# Patient Record
Sex: MALE | Race: ASIAN | Employment: FULL TIME | URBAN - METROPOLITAN AREA
[De-identification: names, ages, dates, MRNs, and addresses within clinical notes are randomized per-mention and may not be internally consistent; named-entity substitution may affect disease eponyms.]

---

## 2017-10-24 ENCOUNTER — OFFICE VISIT (OUTPATIENT)
Dept: INTERNAL MEDICINE | Facility: CLINIC | Age: 27
End: 2017-10-24

## 2017-10-24 VITALS
HEIGHT: 70 IN | TEMPERATURE: 98.8 F | HEART RATE: 61 BPM | RESPIRATION RATE: 18 BRPM | DIASTOLIC BLOOD PRESSURE: 72 MMHG | BODY MASS INDEX: 26.33 KG/M2 | WEIGHT: 183.9 LBS | SYSTOLIC BLOOD PRESSURE: 112 MMHG | OXYGEN SATURATION: 99 %

## 2017-10-24 DIAGNOSIS — R05.9 COUGH: ICD-10-CM

## 2017-10-24 DIAGNOSIS — R35.0 URINARY FREQUENCY: ICD-10-CM

## 2017-10-24 DIAGNOSIS — Z23 NEED FOR INFLUENZA VACCINATION: ICD-10-CM

## 2017-10-24 DIAGNOSIS — J30.89 CHRONIC NON-SEASONAL ALLERGIC RHINITIS, UNSPECIFIED TRIGGER: ICD-10-CM

## 2017-10-24 DIAGNOSIS — R06.02 SHORTNESS OF BREATH: ICD-10-CM

## 2017-10-24 DIAGNOSIS — Z76.89 ENCOUNTER TO ESTABLISH CARE: Primary | ICD-10-CM

## 2017-10-24 LAB
ALBUMIN UR-MCNC: NEGATIVE MG/DL
ANION GAP SERPL CALCULATED.3IONS-SCNC: 6 MMOL/L (ref 3–14)
APPEARANCE UR: CLEAR
BILIRUB UR QL STRIP: NEGATIVE
BUN SERPL-MCNC: 15 MG/DL (ref 7–30)
CALCIUM SERPL-MCNC: 8.4 MG/DL (ref 8.5–10.1)
CHLORIDE SERPL-SCNC: 104 MMOL/L (ref 94–109)
CO2 SERPL-SCNC: 26 MMOL/L (ref 20–32)
COLOR UR AUTO: ABNORMAL
CREAT SERPL-MCNC: 0.9 MG/DL (ref 0.66–1.25)
ERYTHROCYTE [DISTWIDTH] IN BLOOD BY AUTOMATED COUNT: 12.7 % (ref 10–15)
GFR SERPL CREATININE-BSD FRML MDRD: >90 ML/MIN/1.7M2
GLUCOSE SERPL-MCNC: 84 MG/DL (ref 70–99)
GLUCOSE UR STRIP-MCNC: NEGATIVE MG/DL
HCT VFR BLD AUTO: 43.8 % (ref 40–53)
HGB BLD-MCNC: 14.4 G/DL (ref 13.3–17.7)
HGB UR QL STRIP: NEGATIVE
KETONES UR STRIP-MCNC: NEGATIVE MG/DL
LEUKOCYTE ESTERASE UR QL STRIP: NEGATIVE
MCH RBC QN AUTO: 28.6 PG (ref 26.5–33)
MCHC RBC AUTO-ENTMCNC: 32.9 G/DL (ref 31.5–36.5)
MCV RBC AUTO: 87 FL (ref 78–100)
MUCOUS THREADS #/AREA URNS LPF: PRESENT /LPF
NITRATE UR QL: NEGATIVE
PH UR STRIP: 7 PH (ref 5–7)
PLATELET # BLD AUTO: 240 10E9/L (ref 150–450)
POTASSIUM SERPL-SCNC: 4.1 MMOL/L (ref 3.4–5.3)
RBC # BLD AUTO: 5.04 10E12/L (ref 4.4–5.9)
RBC #/AREA URNS AUTO: 0 /HPF (ref 0–2)
SODIUM SERPL-SCNC: 137 MMOL/L (ref 133–144)
SOURCE: ABNORMAL
SP GR UR STRIP: 1.01 (ref 1–1.03)
UROBILINOGEN UR STRIP-MCNC: 0 MG/DL (ref 0–2)
WBC # BLD AUTO: 6.5 10E9/L (ref 4–11)
WBC #/AREA URNS AUTO: <1 /HPF (ref 0–2)

## 2017-10-24 RX ORDER — ALBUTEROL SULFATE 90 UG/1
2 AEROSOL, METERED RESPIRATORY (INHALATION) EVERY 6 HOURS PRN
Qty: 6.7 G | Refills: 1 | Status: SHIPPED | OUTPATIENT
Start: 2017-10-24 | End: 2018-07-10

## 2017-10-24 RX ORDER — CETIRIZINE HYDROCHLORIDE 10 MG/1
10 TABLET ORAL DAILY
Qty: 30 TABLET | Refills: 3 | Status: SHIPPED | OUTPATIENT
Start: 2017-10-24 | End: 2018-07-10

## 2017-10-24 RX ORDER — TRIAMCINOLONE ACETONIDE 55 UG/1
2 SPRAY, METERED NASAL DAILY
Qty: 10.8 ML | Refills: 1 | Status: SHIPPED | OUTPATIENT
Start: 2017-10-24 | End: 2018-07-10

## 2017-10-24 ASSESSMENT — PAIN SCALES - GENERAL: PAINLEVEL: NO PAIN (0)

## 2017-10-24 ASSESSMENT — ENCOUNTER SYMPTOMS
POSTURAL DYSPNEA: 1
TASTE DISTURBANCE: 0
SNORES LOUDLY: 0
SORE THROAT: 1
HEMOPTYSIS: 0
SHORTNESS OF BREATH: 1
SINUS CONGESTION: 1
COUGH: 1
TROUBLE SWALLOWING: 0
SINUS PAIN: 0
RESPIRATORY PAIN: 0
DYSPNEA ON EXERTION: 1
DYSURIA: 0
SPUTUM PRODUCTION: 1
NECK MASS: 0
COUGH DISTURBING SLEEP: 0
HEMATURIA: 0
SMELL DISTURBANCE: 0
FLANK PAIN: 0
WHEEZING: 0
DIFFICULTY URINATING: 0
HOARSE VOICE: 0

## 2017-10-24 NOTE — NURSING NOTE
"Injectable Influenza Immunization Documentation    1.  Has the patient received the information for the injectable influenza vaccine? YES     2. Is the patient 6 months of age or older? YES     3. Does the patient have any of the following contraindications?         Severe allergy to eggs? No     Severe allergic reaction to previous influenza vaccines? No   Severe allergy to latex? No       History of Guillain-Washington syndrome? No     Currently have a temperature greater than 100.4F? No        4.  Severely egg allergic patients should have flu vaccine eligibility assessed by an MD, RN, or pharmacist, and those who received flu vaccine should be observed for 15 min by an MD, RN, Pharmacist, Medical Technician, or member of clinic staff.\": YES    5. Latex-allergic patients should be given latex-free influenza vaccine Yes. Please reference the Vaccine latex table to determine if your clinic s product is latex-containing.       Vaccination given by Keke Strange LPN      "

## 2017-10-24 NOTE — PROGRESS NOTES
"Miguelangel Umanzor is a 27 year old male who comes in for    CC: establish care, URI, frequent urination  HPI:    1. Cold symptoms--on and off x5 years, takes Cetirizine or other allergy meds which help but as soon as he stops, the symptoms return. No sinus issues--no sneezing or congestion.  Chest congestion, phlegm in back of throat, always has to clear the throat. If eats something sweet or cold will get a cough. Feels phlegm deep in to the chest, intermittent SOB. Can't always fully breathe in and out/complete a full breath. When lying flat, has difficulty breathing, then has to sit up. Has not tried inhalers in the past. Denies fevers. No night sweats, no unintentional weight loss.     2. Frequent urination--Drinks a lot of water. Urinates 10-15x per day over the past 3-4 months. No pain/burning with urination. No blood in the urine. Does have mild irritation on tip of penis. Denies caffeine intake. No drugs, rare alcohol. No difficulty urinating, no start/stop stream. Hx of a \"bladder operation\" as a child--to widen the urethra.     Other issues discussed today:     There is no problem list on file for this patient.      No current outpatient prescriptions on file.         ALLERGIES: Review of patient's allergies indicates no known allergies.    PAST MEDICAL HX: No past medical history on file.    PAST SURGICAL HX: No past surgical history on file.    IMMUNIZATION HX:   There is no immunization history on file for this patient.    SOCIAL HX:   Social History     Social History Narrative     Answers for HPI/ROS submitted by the patient on 10/24/2017   General Symptoms: No  Skin Symptoms: No  HENT Symptoms: Yes  EYE SYMPTOMS: No  HEART SYMPTOMS: No  LUNG SYMPTOMS: Yes  INTESTINAL SYMPTOMS: No  URINARY SYMPTOMS: Yes  REPRODUCTIVE SYMPTOMS: No  SKELETAL SYMPTOMS: No  BLOOD SYMPTOMS: No  NERVOUS SYSTEM SYMPTOMS: No  MENTAL HEALTH SYMPTOMS: No  Ear pain: No  Ear discharge: No  Hearing loss: No  Tinnitus: " "No  Nosebleeds: No  Congestion: Yes  Sinus pain: No  Trouble swallowing: No   Voice hoarseness: No  Mouth sores: No  Sore throat: Yes  Tooth pain: No  Gum tenderness: No  Bleeding gums: No  Change in taste: No  Change in sense of smell: No  Dry mouth: No  Hearing aid used: No  Neck lump: No  Cough: Yes  Sputum or phlegm: Yes  Coughing up blood: No  Difficulty breating or shortness of breath: Yes  Snoring: No  Wheezing: No  Difficulty breathing on exertion: Yes  Respiratory pain: No  Nighttime Cough: No  Difficulty breathing when lying flat: Yes  Trouble holding urine or incontinence: Yes  Pain or burning: No  Trouble starting or stopping: No  Increased frequency of urination: Yes  Blood in urine: No  Decreased frequency of urination: No  Frequent nighttime urination: No  Flank pain: No  Difficulty emptying bladder: No    OBJECTIVE:  /72 (BP Location: Right arm, Patient Position: Chair, Cuff Size: Adult Regular)  Pulse 61  Temp 98.8  F (37.1  C) (Oral)  Resp 18  Ht 1.765 m (5' 9.5\")  Wt 83.4 kg (183 lb 14.4 oz)  SpO2 99%  BMI 26.77 kg/m2   Wt Readings from Last 1 Encounters:   10/24/17 83.4 kg (183 lb 14.4 oz)     Constitutional: no distress, comfortable, pleasant, well-groomed  Eyes: anicteric, conjunctiva pink, normal extra-ocular movements   Ears, Nose and Throat: tympanic membranes pearly gray with positive light reflex, EACs clear bilaterally, nares mildly erythematous and edematous, throat clear, mucosa pink and moist.   Neck: supple with full range of motion, no thyromegaly, no lymphadenopathy  Cardiovascular: regular rate and rhythm, normal S1 and S2, no murmurs, rubs or gallops  Respiratory: clear to auscultation with good air movement bilaterally, no wheezes or crackles, non-labored  Gastrointestinal: positive bowel sounds, nontender, no hepatosplenomegaly, no masses   Skin: no concerning lesions or rash, no jaundice, temp normal   Psychological: appropriate mood, demonstrates intact judgment " and logical thought process      ASSESSMENT/PLAN:    1. Encounter to establish care  History reviewed and updated.    2. Urinary frequency  Will check urine for infection or STI, though anticipate these will be negative (uses condoms with one monogamous female partner).  - C. trachomatis PCR - Urine, Lab Collect; Future  - N. gonorrhea PCR - Urine, Lab Collect; Future  - UA with Micro reflex to Culture; Future  - Basic metabolic panel; Future    3. Chronic non-seasonal allergic rhinitis, unspecified trigger  Recommended year-round Zyrtec for chronic cough and postnasal drip, and Nasacort to help with nasal inflammation and post-nasal drip.  - cetirizine (ZYRTEC) 10 MG tablet; Take 1 tablet (10 mg) by mouth daily  Dispense: 30 tablet; Refill: 3  - triamcinolone (NASACORT AQ) 55 MCG/ACT Inhaler; Spray 2 sprays into both nostrils daily  Dispense: 10.8 mL; Refill: 1    4. Cough  5. Shortness of breath  Will screen basic labs and recommended albuterol for intermittent cough/SOB. If symptoms persist, will obtain CXR and PFTs.  - CBC with platelets; Future  - M Tuberculosis by Quantiferon; Future  - albuterol (PROAIR HFA) 108 (90 BASE) MCG/ACT Inhaler; Inhale 2 puffs into the lungs every 6 hours as needed for shortness of breath / dyspnea or wheezing  Dispense: 6.7 g; Refill: 1    6. Need for influenza vaccination  Risks and benefits discussed, vaccine administered in clinic today.  - FLU VACCINE, AGE >= 3 YR    FOLLOW UP: in approximately 1 month to follow-up urinary and respiratory symptoms  CRISTÓBAL Castaneda CNP

## 2017-10-24 NOTE — MR AVS SNAPSHOT
After Visit Summary   10/24/2017    Miguelangel Umanzor    MRN: [id number]           Patient Information     Date Of Birth          1990        Visit Information        Provider Department      10/24/2017 1:00 PM Becky Gaines APRN CNP [facility]        Today's Diagnoses     Encounter to establish care    -  1    Urinary frequency        Chronic non-seasonal allergic rhinitis, unspecified trigger        Cough        Shortness of breath        Need for influenza vaccination          Care Instructions    Primary Care Center Medication Refill Request Information:  * Please contact your pharmacy regarding ANY request for medication refills.  ** [facility] Prescription Fax = [phone]  * Please allow 3 business days for routine medication refills.  * Please allow 5 business days for controlled substance medication refills.     Primary Care Center Test Result notification information:  *You will be notified with in 7-10 days of your appointment day regarding the results of your test.  If you are on MyChart you will be notified as soon as the provider has reviewed the results and signed off on them.    Primary Care Center [phone]     CRISTÓBAL Moreira CNP  Nurse: Yuliana Vegas RN    Primary Care Center  [address] [place] [address]   Mailcode: [id number]  [place] [address]  Phone: [phone]  Fax: [phone]    Please go to the lab on the first floor.            Follow-ups after your visit        Follow-up notes from your care team     Return in about 4 weeks (around 11/21/2017).      Your next 10 appointments already scheduled     Oct 24, 2017  2:15 PM CDT   LAB with [facility] ([facility])    [address] [place] [address]  1st Floor  [facility] [place] [address] [phone]           Patient must bring picture ID. Patient should be prepared to give a urine specimen  Please do not eat 10-12 hours before your appointment if you are coming in fasting  for labs on lipids, cholesterol, or glucose (sugar). Pregnant women should follow their Care Team instructions. Water with medications is okay. Do not drink coffee or other fluids. If you have concerns about taking  your medications, please ask at office or if scheduling via SciAps, send a message by clicking on Secure Messaging, Message Your Care Team.            Nov 27, 2017  2:00 PM CST   (Arrive by 1:45 PM)   Return Visit with CRISTÓBAL Mullen [facility] ([facility])    [address] [place] [address]  4th [facility] [id number] [phone]              Future tests that were ordered for you today     Open Future Orders        Priority Expected Expires Ordered    M Tuberculosis by Quantiferon Routine 10/24/2017 10/24/2018 10/24/2017    CBC with platelets Routine 10/24/2017 11/7/2017 10/24/2017    Basic metabolic panel Routine 10/24/2017 11/7/2017 10/24/2017    C. trachomatis PCR - Urine, Lab Collect Routine 10/24/2017 10/24/2018 10/24/2017    N. gonorrhea PCR - Urine, Lab Collect Routine 10/24/2017 10/24/2018 10/24/2017    UA with Micro reflex to Culture Routine 10/24/2017 10/24/2018 10/24/2017            Who to contact     Please call your clinic at [phone] to:    Ask questions about your health    Make or cancel appointments    Discuss your medicines    Learn about your test results    Speak to your doctor   If you have compliments or concerns about an experience at your clinic, or if you wish to file a complaint, please contact [facility] Patient Relations at [phone] or email us at [email or web address]         Additional Information About Your Visit        [employer] Information     SciAps is an electronic gateway that provides easy, online access to your medical records. With SciAps, you can request a clinic appointment, read your test results, renew a prescription or communicate with your care  "team.     To sign up for Xinhua Travelt visit the website at [email or web address]   You will be asked to enter the access code listed below, as well as some personal information. Please follow the directions to create your username and password.     Your access code is: [email or web address]  Expires: 2018  6:30 AM     Your access code will  in 90 days. If you need help or a new code, please contact your [facility] or call [phone] for assistance.        Care EveryWhere ID     This is your Care EveryWhere ID. This could be used by other organizations to access your [facility] medical records  [id number]        Your Vitals Were     Pulse Temperature Respirations Height Pulse Oximetry BMI (Body Mass Index)    61 98.8  F (37.1  C) (Oral) 18 1.765 m (5' 9.5\") 99% 26.77 kg/m2       Blood Pressure from Last 3 Encounters:   10/24/17 112/72    Weight from Last 3 Encounters:   10/24/17 83.4 kg (183 lb 14.4 oz)              We Performed the Following     FLU VACCINE, AGE >= 3 YR          Today's Medication Changes          These changes are accurate as of: 10/24/17  1:49 PM.  If you have any questions, ask your nurse or doctor.               Start taking these medicines.        Dose/Directions    albuterol 108 (90 BASE) MCG/ACT Inhaler   Commonly known as:  PROAIR HFA   Used for:  Cough, Shortness of breath   Started by:  Becky Gaines APRN CNP        Dose:  2 puff   Inhale 2 puffs into the lungs every 6 hours as needed for shortness of breath / dyspnea or wheezing   Quantity:  6.7 g   Refills:  1       cetirizine 10 MG tablet   Commonly known as:  zyrTEC   Used for:  Chronic non-seasonal allergic rhinitis, unspecified trigger   Started by:  Becky Gaines APRN CNP        Dose:  10 mg   Take 1 tablet (10 mg) by mouth daily   Quantity:  30 tablet   Refills:  3       triamcinolone 55 MCG/ACT Inhaler   Commonly known as:  NASACORT AQ   Used for:  Chronic non-seasonal allergic " rhinitis, unspecified trigger   Started by:  Becky Gaines APRN CNP        Dose:  2 spray   Spray 2 sprays into both nostrils daily   Quantity:  10.8 mL   Refills:  1            Where to get your medicines      These medications were sent to [facility] [address] [facility] - [place], [place] - [address]  [address], [facility] [address]     Phone:  [phone]     albuterol 108 (90 BASE) MCG/ACT Inhaler    cetirizine 10 MG tablet    triamcinolone 55 MCG/ACT Inhaler                Primary Care Provider    None Specified       No primary provider on file.        Equal Access to Services     [facility]: Hadii rcis sage hadjessica Melara, waaxda lunicholasadaha, qaybángela kaalmasaúl manuel, alirio flannery . So [facility] [phone].    ATENCIÓN: Si habla español, tiene a rodriguez disposición servicios gratuitos de asistencia lingüística. Llame al [phone].    We comply with applicable federal civil rights laws and [place] laws. We do not discriminate on the basis of race, color, national origin, age, disability, sex, sexual orientation, or gender identity.            Thank you!     Thank you for choosing [facility]  for your care. Our goal is always to provide you with excellent care. Hearing back from our patients is one way we can continue to improve our services. Please take a few minutes to complete the written survey that you may receive in the mail after your visit with us. Thank you!             Your Updated Medication List - Protect others around you: Learn how to safely use, store and throw away your medicines at www.disposemymeds.org.          This list is accurate as of: 10/24/17  1:49 PM.  Always use your most recent med list.                   Brand Name Dispense Instructions for use Diagnosis    albuterol 108 (90 BASE) MCG/ACT Inhaler    PROAIR HFA    6.7 g    Inhale 2 puffs into the lungs every 6 hours as needed for shortness of breath / dyspnea or wheezing     Cough, Shortness of breath       cetirizine 10 MG tablet    zyrTEC    30 tablet    Take 1 tablet (10 mg) by mouth daily    Chronic non-seasonal allergic rhinitis, unspecified trigger       triamcinolone 55 MCG/ACT Inhaler    NASACORT AQ    10.8 mL    Spray 2 sprays into both nostrils daily    Chronic non-seasonal allergic rhinitis, unspecified trigger

## 2017-10-24 NOTE — PATIENT INSTRUCTIONS
[facility] Medication Refill Request Information:  * Please contact your pharmacy regarding ANY request for medication refills.  ** [facility] Prescription Fax = [phone]  * Please allow 3 business days for routine medication refills.  * Please allow 5 business days for controlled substance medication refills.     [facility] Test Result notification information:  *You will be notified with in 7-10 days of your appointment day regarding the results of your test.  If you are on MyChart you will be notified as soon as the provider has reviewed the results and signed off on them.    [facility] [phone]     CRISTÓBAL Moreira CNP  Nurse: Yuliana Vegas RN    [address]   Mailcode: [id number]  [place] [address]  Phone: [phone]  Fax: [phone]    Please go to the lab on the first floor.

## 2017-10-24 NOTE — NURSING NOTE
Chief Complaint   Patient presents with     Establish Care     Here to establish care for new PCP     Polyuria     Also here for polyuria     URI     Here for cold, sinus congestion, cough, and SOB     Gavin Dye CMA (AAMA) at 1:12 PM on 10/24/2017

## 2017-10-25 LAB
C TRACH DNA SPEC QL NAA+PROBE: NEGATIVE
N GONORRHOEA DNA SPEC QL NAA+PROBE: NEGATIVE
SPECIMEN SOURCE: NORMAL
SPECIMEN SOURCE: NORMAL

## 2017-10-26 LAB
M TB TUBERC IFN-G BLD QL: NEGATIVE
M TB TUBERC IFN-G/MITOGEN IGNF BLD: 0 IU/ML

## 2017-11-27 ENCOUNTER — OFFICE VISIT (OUTPATIENT)
Dept: INTERNAL MEDICINE | Facility: CLINIC | Age: 27
End: 2017-11-27

## 2017-11-27 VITALS
OXYGEN SATURATION: 98 % | DIASTOLIC BLOOD PRESSURE: 65 MMHG | WEIGHT: 185.6 LBS | HEART RATE: 71 BPM | BODY MASS INDEX: 27.02 KG/M2 | RESPIRATION RATE: 20 BRPM | SYSTOLIC BLOOD PRESSURE: 125 MMHG

## 2017-11-27 DIAGNOSIS — Z23 NEED FOR TETANUS BOOSTER: ICD-10-CM

## 2017-11-27 DIAGNOSIS — R05.9 COUGH: Primary | ICD-10-CM

## 2017-11-27 ASSESSMENT — PAIN SCALES - GENERAL: PAINLEVEL: NO PAIN (0)

## 2017-11-27 NOTE — NURSING NOTE
Chief Complaint   Patient presents with     Cold Symptoms     Patient is here to follow up on cold symptoms.      Aylin Ortiz LPN at 1:52 PM on 11/27/2017.

## 2017-11-27 NOTE — MR AVS SNAPSHOT
After Visit Summary   11/27/2017    Miguelangel Umanzor    MRN: [id number]           Patient Information     Date Of Birth          1990        Visit Information        Provider Department      11/27/2017 2:00 PM Becky Gaines APRN [facility]        Today's Diagnoses     Cough    -  1    Need for tetanus booster          Care Instructions    [facility]: [phone]     Primary Care Center Medication Refill Request Information:  * Please contact your pharmacy regarding ANY request for medication refills.  ** [facility] Prescription Fax = [phone]  * Please allow 3 business days for routine medication refills.  * Please allow 5 business days for controlled substance medication refills.     Primary Care Center Test Result notification information:  *You will be notified with in 7-10 days of your appointment day regarding the results of your test.  If you are on MyChart you will be notified as soon as the provider has reviewed the results and signed off on them.    Use the albuterol inhaler every 4-6 hours only as needed if you are feeling short of breath, coughing, or wheezing.     Schedule the Pulmonary Function Test (PFT). Try to avoid using the albuterol inhaler for 6 hours prior to the test, if possible.    Use the Nasacort nasal spray daily--do not use within 2 hours before bedtime, as it will drip down the back of your throat.          Follow-ups after your visit        Your next 10 appointments already scheduled     Dec 01, 2017  3:35 PM CST   (Arrive by 3:20 PM)   XR CHEST 2 VIEWS with UCXR1   [facility] ([facility])    [address] [address] [phone]           Please bring a list of your current medicines to your exam. (Include vitamins, minerals and over-thecounter medicines.) Leave your valuables at home.  Tell your doctor if there is a chance you may be pregnant.  You do  not need to do anything special for this exam.            Dec 01, 2017  4:00 PM CST   FULL PULMONARY FUNCTION with  PFL D   [facility] Pulmonary Function Testing ([facility])    [address] [facility]  3rd [facility] [address] [phone]              Future tests that were ordered for you today     Open Future Orders        Priority Expected Expires Ordered    General PFT Lab (Please always keep checked) Routine  2018    XR Chest 2 Views Routine 2017            Who to contact     Please call your clinic at [phone] to:    Ask questions about your health    Make or cancel appointments    Discuss your medicines    Learn about your test results    Speak to your doctor   If you have compliments or concerns about an experience at your clinic, or if you wish to file a complaint, please contact [facility] Patient Relations at [phone] or email us at [email or web address]         Additional Information About Your Visit        Iizuu Information     Iizuu is an electronic gateway that provides easy, online access to your medical records. With Iizuu, you can request a clinic appointment, read your test results, renew a prescription or communicate with your care team.     To sign up for Iizuu visit the website at [email or web address]   You will be asked to enter the access code listed below, as well as some personal information. Please follow the directions to create your username and password.     Your access code is: [email or web address]  Expires: 2018  5:30 AM     Your access code will  in 90 days. If you need help or a new code, please contact your [facility] or call [phone] for assistance.        Care EveryWhere ID     This is your Care EveryWhere ID. This could be used by other organizations to access your [facility]  records  [id number]        Your Vitals Were     Pulse Respirations Pulse Oximetry BMI (Body Mass Index)          71 20 98% 27.02 kg/m2         Blood Pressure from Last 3 Encounters:   11/27/17 125/65   10/24/17 112/72    Weight from Last 3 Encounters:   11/27/17 84.2 kg (185 lb 9.6 oz)   10/24/17 83.4 kg (183 lb 14.4 oz)              We Performed the Following     TDAP VACCINE (BOOSTRIX)        Primary Care Provider Office Phone # Fax #    Becky Gaines, APRN [facility] [phone] [address] [facility] [address]        Equal Access to Services     RAFAELA ELIAS : Hadii aad ku hadasho Somiriam, waaxda luqadaha, qaybta kaalmada adeegyada, alirio flannery . So [facility] [phone].    ATENCIÓN: Si habla español, tiene a rodriguez disposición servicios gratuitos de asistencia lingüística. Llame al [phone].    We comply with applicable federal civil rights laws and [place] laws. We do not discriminate on the basis of race, color, national origin, age, disability, sex, sexual orientation, or gender identity.            Thank you!     Thank you for choosing [facility]  for your care. Our goal is always to provide you with excellent care. Hearing back from our patients is one way we can continue to improve our services. Please take a few minutes to complete the written survey that you may receive in the mail after your visit with us. Thank you!             Your Updated Medication List - Protect others around you: Learn how to safely use, store and throw away your medicines at www.disposemymeds.org.          This list is accurate as of: 11/27/17  2:42 PM.  Always use your most recent med list.                   Brand Name Dispense Instructions for use Diagnosis    albuterol 108 (90 BASE) MCG/ACT Inhaler    PROAIR HFA    6.7 g    Inhale 2 puffs into the lungs every 6 hours as needed for shortness of breath / dyspnea or wheezing    Cough, Shortness of breath        cetirizine 10 MG tablet    zyrTEC    30 tablet    Take 1 tablet (10 mg) by mouth daily    Chronic non-seasonal allergic rhinitis, unspecified trigger       triamcinolone 55 MCG/ACT Inhaler    NASACORT AQ    10.8 mL    Spray 2 sprays into both nostrils daily    Chronic non-seasonal allergic rhinitis, unspecified trigger

## 2017-11-27 NOTE — PROGRESS NOTES
Miguelangel Umanzor is a 27 year old male who comes in for    CC: follow-up cold symptoms  HPI:    Mr. Umanzor has been using Zyrtec daily, Nasacort every few days. Cough has improved. Continues to feel phlegm in the back of the throat, feels need to clear his throat frequently.  Has noticed some improvement with SOB and taking a deep breath, but not significant; uses albuterol once per day--doesn't notice an improvement with this. He depresses the inhaler into his mouth and then breathes.  Denies SOB with lying flat, no CP.  He tried switching to almond milk and then skim milk and thought this helped reduce the phlegm in his throat.      Other issues discussed today:     Patient Active Problem List   Diagnosis     Chronic non-seasonal allergic rhinitis, unspecified trigger       Current Outpatient Prescriptions   Medication Sig Dispense Refill     cetirizine (ZYRTEC) 10 MG tablet Take 1 tablet (10 mg) by mouth daily 30 tablet 3     triamcinolone (NASACORT AQ) 55 MCG/ACT Inhaler Spray 2 sprays into both nostrils daily 10.8 mL 1     albuterol (PROAIR HFA) 108 (90 BASE) MCG/ACT Inhaler Inhale 2 puffs into the lungs every 6 hours as needed for shortness of breath / dyspnea or wheezing 6.7 g 1         ALLERGIES: Review of patient's allergies indicates no known allergies.    PAST MEDICAL HX:   Past Medical History:   Diagnosis Date     Allergic rhinitis        PAST SURGICAL HX: No past surgical history on file.    IMMUNIZATION HX:   Immunization History   Administered Date(s) Administered     Influenza Vaccine IM 3yrs+ 4 Valent IIV4 10/24/2017       SOCIAL HX:   Social History     Social History Narrative    Works as , low stress. Single, no children. Born in [facility].     Moved to [place] in summer 2017, previously worked in [place], [place], [employer].       ROS:   CONSTITUTIONAL: no fatigue, no unexpected change in weight  SKIN: no worrisome rashes, no worrisome moles, no worrisome lesions  EYES: no acute vision  problems or changes  ENT:see HPI  RESP: no significant cough, no shortness of breath  CV: no chest pain, no palpitations, no new or worsening peripheral edema  GI: no nausea, no vomiting, no constipation, no diarrhea    OBJECTIVE:  /65  Pulse 71  Resp 20  Wt 84.2 kg (185 lb 9.6 oz)  SpO2 98%  BMI 27.02 kg/m2   Wt Readings from Last 1 Encounters:   11/27/17 84.2 kg (185 lb 9.6 oz)     Constitutional: no distress, comfortable, pleasant, well-groomed  Eyes: anicteric, conjunctiva pink, normal extra-ocular movements   Ears, Nose and Throat: tympanic membranes pearly gray with positive light reflex, EACs clear bilaterally, nose clear and free of lesions, throat clear, mucosa pink and moist.   Neck: supple with full range of motion, no thyromegaly, no lymphadenopathy  Cardiovascular: regular rate and rhythm, normal S1 and S2, no murmurs, rubs or gallops  Respiratory: clear to auscultation with good air movement bilaterally, no wheezes or crackles, non-labored      ASSESSMENT/PLAN:    1. Cough  Will check PFTs and CXR today given intermittent SOB. Reviewed using albuterol only as needed--reviewed instructions for use; it does not sound like he's been using this appropriately. Recommended Nasacort daily and continue with Zyrtec. If testing is normal, will refer to ENT as needed.  - General PFT Lab (Please always keep checked); Future  - XR Chest 2 Views; Future    2. Need for tetanus booster  Risks and benefits discussed, vaccine administered in clinic today.  - TDAP VACCINE (BOOSTRIX)    FOLLOW UP: If not improving or if worsening, or as needed for any changes or concerns  CRISTÓBAL Castaneda CNP

## 2017-11-27 NOTE — PATIENT INSTRUCTIONS
[facility]): [phone]     [facility] Medication Refill Request Information:  * Please contact your pharmacy regarding ANY request for medication refills.  ** [facility] Prescription Fax = [phone]  * Please allow 3 business days for routine medication refills.  * Please allow 5 business days for controlled substance medication refills.     [facility] Test Result notification information:  *You will be notified with in 7-10 days of your appointment day regarding the results of your test.  If you are on MyChart you will be notified as soon as the provider has reviewed the results and signed off on them.    Use the albuterol inhaler every 4-6 hours only as needed if you are feeling short of breath, coughing, or wheezing.     Schedule the Pulmonary Function Test (PFT). Try to avoid using the albuterol inhaler for 6 hours prior to the test, if possible.    Use the Nasacort nasal spray daily--do not use within 2 hours before bedtime, as it will drip down the back of your throat.

## 2017-12-01 DIAGNOSIS — R05.9 COUGH: ICD-10-CM

## 2017-12-06 LAB
DLCOUNC-%PRED-PRE: 74 %
DLCOUNC-PRE: 26.64 ML/MIN/MMHG
DLCOUNC-PRED: 35.7 ML/MIN/MMHG
ERV-%PRED-PRE: 72 %
ERV-PRE: 1.2 L
ERV-PRED: 1.66 L
EXPTIME-PRE: 7.3 SEC
FEF2575-%PRED-POST: 86 %
FEF2575-%PRED-PRE: 69 %
FEF2575-POST: 3.82 L/SEC
FEF2575-PRE: 3.05 L/SEC
FEF2575-PRED: 4.42 L/SEC
FEFMAX-%PRED-PRE: 81 %
FEFMAX-PRE: 8.2 L/SEC
FEFMAX-PRED: 10.12 L/SEC
FEV1-%PRED-PRE: 88 %
FEV1-PRE: 3.69 L
FEV1FEV6-PRE: 76 %
FEV1FEV6-PRED: 84 %
FEV1FVC-PRE: 76 %
FEV1FVC-PRED: 84 %
FEV1SVC-PRE: 76 %
FEV1SVC-PRED: 74 %
FIFMAX-PRE: 9.95 L/SEC
FRCPLETH-%PRED-PRE: 73 %
FRCPLETH-PRE: 2.43 L
FRCPLETH-PRED: 3.29 L
FVC-%PRED-PRE: 96 %
FVC-PRE: 4.83 L
FVC-PRED: 4.98 L
IC-%PRED-PRE: 90 %
IC-PRE: 3.63 L
IC-PRED: 3.99 L
RVPLETH-%PRED-PRE: 72 %
RVPLETH-PRE: 1.23 L
RVPLETH-PRED: 1.68 L
TLCPLETH-%PRED-PRE: 86 %
TLCPLETH-PRE: 6.05 L
TLCPLETH-PRED: 7.02 L
VA-%PRED-PRE: 86 %
VA-PRE: 5.82 L
VC-%PRED-PRE: 85 %
VC-PRE: 4.83 L
VC-PRED: 5.65 L

## 2017-12-11 ENCOUNTER — TELEPHONE (OUTPATIENT)
Dept: INTERNAL MEDICINE | Facility: CLINIC | Age: 27
End: 2017-12-11

## 2017-12-11 DIAGNOSIS — J30.89 CHRONIC NON-SEASONAL ALLERGIC RHINITIS, UNSPECIFIED TRIGGER: Primary | ICD-10-CM

## 2017-12-11 DIAGNOSIS — R05.3 CHRONIC COUGH: ICD-10-CM

## 2017-12-11 NOTE — TELEPHONE ENCOUNTER
Recommend pt try Ranitidine 150 mg BID and refer to ENT for chronic cough with normal PFTs.  CRISTÓBAL Castaneda CNP

## 2017-12-11 NOTE — TELEPHONE ENCOUNTER
Reviewed normal PFTs with patient. Pt stated he still has persistent cough and symptoms from visit, has not improved. Pt would like to know if referral is possible for next steps. Will route to provider.    Yuliana Vegas RN

## 2017-12-11 NOTE — TELEPHONE ENCOUNTER
Discussed referral and ranitidine with pt. He voiced understanding and stated that he was told he could have possible GERD. Provided number for ENT. Pt also wanted to schedule follow up with Becky in case ranitidine does not help with GERD/cough symptoms. Stated he would like to know if a scope is needed. Advised pt to try medication first. Can follow up with clinic visit for alternative treatment if needed. Appt scheduled. Pt stated he will cancel appt if symptoms improve.    Yuliana Vegas RN

## 2017-12-29 ENCOUNTER — PRE VISIT (OUTPATIENT)
Dept: OTOLARYNGOLOGY | Facility: CLINIC | Age: 27
End: 2017-12-29

## 2017-12-29 NOTE — TELEPHONE ENCOUNTER
APPT INFO    Date /Time: 1/12/18 at 4PM   Reason for Appt: Rhinitis   Ref Provider/Clinic: Deysi Gaines   Are there internal records? Yes/No?  IF YES, list clinic names: [facility]   Are there outside records? Yes/No? No   Patient Contact (Y/N) & Call Details: No   Action: Chart reviewed

## 2018-01-12 ENCOUNTER — OFFICE VISIT (OUTPATIENT)
Dept: OTOLARYNGOLOGY | Facility: CLINIC | Age: 28
End: 2018-01-12
Payer: COMMERCIAL

## 2018-01-12 VITALS — BODY MASS INDEX: 28.41 KG/M2 | HEIGHT: 67 IN | WEIGHT: 181 LBS

## 2018-01-12 DIAGNOSIS — J34.3 NASAL TURBINATE HYPERTROPHY: ICD-10-CM

## 2018-01-12 DIAGNOSIS — J31.0 CHRONIC RHINITIS, UNSPECIFIED TYPE: Primary | ICD-10-CM

## 2018-01-12 RX ORDER — FLUTICASONE PROPIONATE 50 MCG
2 SPRAY, SUSPENSION (ML) NASAL DAILY
Qty: 16 G | Refills: 1 | Status: SHIPPED | OUTPATIENT
Start: 2018-01-12 | End: 2018-02-11

## 2018-01-12 RX ORDER — AZELASTINE 1 MG/ML
1 SPRAY, METERED NASAL 2 TIMES DAILY
Qty: 1 BOTTLE | Refills: 3 | Status: SHIPPED | OUTPATIENT
Start: 2018-01-12 | End: 2018-02-11

## 2018-01-12 ASSESSMENT — PAIN SCALES - GENERAL: PAINLEVEL: NO PAIN (0)

## 2018-01-12 NOTE — PROGRESS NOTES
The patient presents with a history of nasal obstruction and chronic rhinitis with throat irration.  The patient is having difficulty breathing through the nostrils.  The patient denies difficulty with sinus infections or facial pain.  The patient denies ear infections, but he reports bilateral eustachian tube dysfunction.  The patient reports that he has had some difficulty with nasal airflow for many months, but that the symptoms appear to becoming more severe recently. He was evaluated for asthma and this evaluation was negative. He has since stopped using Albuterol. He has been treated for gastroesophageal reflux. The patient denies sinusitis, rhinitis, facial pain, nasal obstruction or purulent nasal discharge. The patient denies chronic or recurrent tonsillitis, chronic or recurrent pharyngitis. The patient denies otalgia, otorrhea, eustachian tube dysfunction, ear infections, dizziness or tinnitus.     This patient is seen in consultation at the request of Dr. Becky Gaines.    All other systems were reviewed and they are either negative or they are not directly pertinent to this Otolaryngology examination.      Past Medical History:    Past Medical History:   Diagnosis Date     Allergic rhinitis        Past Surgical History:    No past surgical history on file.    Medications:      Current Outpatient Prescriptions:      Pseudoeph-Doxylamine-DM-APAP (NYQUIL PO), , Disp: , Rfl:      ranitidine (ZANTAC) 150 MG tablet, Take 1 tablet (150 mg) by mouth 2 times daily, Disp: 60 tablet, Rfl: 1     cetirizine (ZYRTEC) 10 MG tablet, Take 1 tablet (10 mg) by mouth daily, Disp: 30 tablet, Rfl: 3     triamcinolone (NASACORT AQ) 55 MCG/ACT Inhaler, Spray 2 sprays into both nostrils daily, Disp: 10.8 mL, Rfl: 1     albuterol (PROAIR HFA) 108 (90 BASE) MCG/ACT Inhaler, Inhale 2 puffs into the lungs every 6 hours as needed for shortness of breath / dyspnea or wheezing (Patient not taking: Reported on 1/12/2018), Disp: 6.7  g, Rfl: 1    Allergies:    Review of patient's allergies indicates no known allergies.    Physical Examination:    The patient is a well developed, well nourished male in no apparent distress.  He is normocephalic, atraumatic with pupils equally round and reactive to light.    Oral Cavity Examination:  Normal mucosa with no masses or lesions  Nasal Examination:  Engorged nasal turbinates and congested nasal mucosa and a deviated septum.  There are no masses or lesions in either nostril and no discharge or infection.  Ear Examination: Ear canals clear, tympanic membranes and middle ear spaces normal  Neurological Examination: Facial nerve function intact and symmetric  Integumentary Examination: No lesions on the skin of the head and neck  Neck Examination: No masses or lesions, no lymphadenopathy  Endocrine Examination: Normal thyroid examination  Flexible Fiberoptic Laryngoscopy:  Normal nasopharynx, base of tongue, pyriform sinuses, epiglottis, valleculae, false vocal cords, true vocal cords, and larynx.  Normal motion of the vocal cords with no lesions, masses, nodules, or polyps bilaterally.     Assessment and Plan:    The patient presents with a history of nasal obstruction and chronic rhinitis. He will be treated with Astelin Nasal Spray and Flonase Nasal Spray and he will be seen again in four weeks to assess he response to this therapy. He will be referred to Dr. Yusuf Canales for an allergy evaluation.     CC: Dr. Becky Gaines

## 2018-01-12 NOTE — LETTER
1/12/2018       RE: Miguelangel Umanzor  [address]    [place] [facility] [address]     Dear Colleague,    Thank you for referring your patient, Miguelangel Umanzor, to the [facility] EAR NOSE AND THROAT at [facility]. Please see a copy of my visit note below.    The patient presents with a history of nasal obstruction and chronic rhinitis with throat irration.  The patient is having difficulty breathing through the nostrils.  The patient denies difficulty with sinus infections or facial pain.  The patient denies ear infections, but he reports bilateral eustachian tube dysfunction.  The patient reports that he has had some difficulty with nasal airflow for many months, but that the symptoms appear to becoming more severe recently. He was evaluated for asthma and this evaluation was negative. He has since stopped using Albuterol. He has been treated for gastroesophageal reflux. The patient denies sinusitis, rhinitis, facial pain, nasal obstruction or purulent nasal discharge. The patient denies chronic or recurrent tonsillitis, chronic or recurrent pharyngitis. The patient denies otalgia, otorrhea, eustachian tube dysfunction, ear infections, dizziness or tinnitus.     This patient is seen in consultation at the request of Dr. Becky Gaines.    All other systems were reviewed and they are either negative or they are not directly pertinent to this Otolaryngology examination.      Past Medical History:    Past Medical History:   Diagnosis Date     Allergic rhinitis        Past Surgical History:    No past surgical history on file.    Medications:      Current Outpatient Prescriptions:      Pseudoeph-Doxylamine-DM-APAP (NYQUIL PO), , Disp: , Rfl:      ranitidine (ZANTAC) 150 MG tablet, Take 1 tablet (150 mg) by mouth 2 times daily, Disp: 60 tablet, Rfl: 1     cetirizine (ZYRTEC) 10 MG tablet, Take 1 tablet (10 mg) by mouth daily, Disp: 30 tablet, Rfl: 3     triamcinolone (NASACORT AQ)  55 MCG/ACT Inhaler, Spray 2 sprays into both nostrils daily, Disp: 10.8 mL, Rfl: 1     albuterol (PROAIR HFA) 108 (90 BASE) MCG/ACT Inhaler, Inhale 2 puffs into the lungs every 6 hours as needed for shortness of breath / dyspnea or wheezing (Patient not taking: Reported on 1/12/2018), Disp: 6.7 g, Rfl: 1    Allergies:    Review of patient's allergies indicates no known allergies.    Physical Examination:    The patient is a well developed, well nourished male in no apparent distress.  He is normocephalic, atraumatic with pupils equally round and reactive to light.    Oral Cavity Examination:  Normal mucosa with no masses or lesions  Nasal Examination:  Engorged nasal turbinates and congested nasal mucosa and a deviated septum.  There are no masses or lesions in either nostril and no discharge or infection.  Ear Examination: Ear canals clear, tympanic membranes and middle ear spaces normal  Neurological Examination: Facial nerve function intact and symmetric  Integumentary Examination: No lesions on the skin of the head and neck  Neck Examination: No masses or lesions, no lymphadenopathy  Endocrine Examination: Normal thyroid examination  Flexible Fiberoptic Laryngoscopy:  Normal nasopharynx, base of tongue, pyriform sinuses, epiglottis, valleculae, false vocal cords, true vocal cords, and larynx.  Normal motion of the vocal cords with no lesions, masses, nodules, or polyps bilaterally.     Assessment and Plan:    The patient presents with a history of nasal obstruction and chronic rhinitis. He will be treated with Astelin Nasal Spray and Flonase Nasal Spray and he will be seen again in four weeks to assess he response to this therapy. He will be referred to Dr. Yusuf Canales for an allergy evaluation.     CC: Dr. Becky Gaines    Again, thank you for allowing me to participate in the care of your patient.      Sincerely,    Charles Murrell MD

## 2018-01-12 NOTE — MR AVS SNAPSHOT
After Visit Summary   1/12/2018    Miguelangel Umanzor    MRN: [id number]           Patient Information     Date Of Birth          1990        Visit Information        Provider Department      1/12/2018 4:00 PM Charles Murrell MD [facility]        Today's Diagnoses     Chronic rhinitis, unspecified type    -  1    Nasal turbinate hypertrophy          Care Instructions    The patient presents with a history of nasal obstruction and chronic rhinitis. He will be treated with Astelin Nasal Spray and Flonase Nasal Spray and he will be seen again in four weeks to assess he response to this therapy. He will be referred to Dr. Yusuf Canales for an allergy evaluation.           Follow-ups after your visit        Your next 10 appointments already scheduled     Feb 09, 2018  4:30 PM CST   (Arrive by 4:15 PM)   Return Visit with MD [facility] ([facility])    [address] [place] [id number] [phone]              Who to contact     Please call your clinic at [phone] to:    Ask questions about your health    Make or cancel appointments    Discuss your medicines    Learn about your test results    Speak to your doctor   If you have compliments or concerns about an experience at your clinic, or if you wish to file a complaint, please contact [facility] Patient Relations at [phone] or email us at [email or web address]         Additional Information About Your Visit        MyChart Information     [employer] is an electronic gateway that provides easy, online access to your medical records. With [employer], you can request a clinic appointment, read your test results, renew a prescription or communicate with your care team.     To sign up for [employer] visit the website at [email or web address]   You will be asked to enter the access code  "listed below, as well as some personal information. Please follow the directions to create your username and password.     Your access code is: [email or web address]  Expires: 2018  5:30 AM     Your access code will  in 90 days. If you need help or a new code, please contact your [facility] or call [phone] for assistance.        Care EveryWhere ID     This is your Care EveryWhere ID. This could be used by other organizations to access your [facility] medical records  [id number]        Your Vitals Were     Height BMI (Body Mass Index)                1.71 m (5' 7.32\") 28.08 kg/m2           Blood Pressure from Last 3 Encounters:   17 125/65   10/24/17 112/72    Weight from Last 3 Encounters:   18 82.1 kg (181 lb)   17 84.2 kg (185 lb 9.6 oz)   10/24/17 83.4 kg (183 lb 14.4 oz)              We Performed the Following     OFFICE CONSULTATION,LEVEL III          Today's Medication Changes          These changes are accurate as of: 18  4:57 PM.  If you have any questions, ask your nurse or doctor.               Start taking these medicines.        Dose/Directions    azelastine 0.1 % spray   Commonly known as:  ASTELIN   Used for:  Chronic rhinitis, unspecified type, Nasal turbinate hypertrophy   Started by:  Charles Murrell MD        Dose:  1 spray   Spray 1 spray into both nostrils 2 times daily 1 puff each nostril BID X 2 month supply   Quantity:  1 Bottle   Refills:  3       fluticasone 50 MCG/ACT spray   Commonly known as:  FLONASE ALLERGY RELIEF   Used for:  Chronic rhinitis, unspecified type, Nasal turbinate hypertrophy   Started by:  Charles Murrell MD        Dose:  2 spray   Spray 2 sprays into both nostrils daily   Quantity:  16 g   Refills:  1            Where to get your medicines      These medications were sent to [facility] [address] [place] [address] AT [address] [address] "    Hours:  24-hours Phone:  [phone]     azelastine 0.1 % spray    fluticasone 50 MCG/ACT spray                Primary Care Provider Office Phone # Fax #    CRISTÓBAL Mullen LILY [phone] [address] [facility] [place] [address]        Equal Access to Services     [facility]: Hadii aad ku hadasho Soomaali, waaxda luqadaha, qaybta kaalmada adeegyada, waxay idiin hayaan adeeg kharash lamanuel . So [facility] [phone].    ATENCIÓN: Si habla español, tiene a rodriguez disposición servicios gratuitos de asistencia lingüística. Efe al [phone].    We comply with applicable federal civil rights laws and [place] laws. We do not discriminate on the basis of race, color, national origin, age, disability, sex, sexual orientation, or gender identity.            Thank you!     Thank you for choosing [facility] EAR NOSE AND THROAT  for your care. Our goal is always to provide you with excellent care. Hearing back from our patients is one way we can continue to improve our services. Please take a few minutes to complete the written survey that you may receive in the mail after your visit with us. Thank you!             Your Updated Medication List - Protect others around you: Learn how to safely use, store and throw away your medicines at www.disposemymeds.org.          This list is accurate as of: 1/12/18  4:57 PM.  Always use your most recent med list.                   Brand Name Dispense Instructions for use Diagnosis    albuterol 108 (90 BASE) MCG/ACT Inhaler    PROAIR HFA    6.7 g    Inhale 2 puffs into the lungs every 6 hours as needed for shortness of breath / dyspnea or wheezing    Cough, Shortness of breath       azelastine 0.1 % spray    ASTELIN    1 Bottle    Spray 1 spray into both nostrils 2 times daily 1 puff each nostril BID X 2 month supply    Chronic rhinitis, unspecified type, Nasal turbinate hypertrophy       cetirizine 10 MG tablet    zyrTEC    30 tablet    Take 1 tablet (10  mg) by mouth daily    Chronic non-seasonal allergic rhinitis, unspecified trigger       fluticasone 50 MCG/ACT spray    FLONASE ALLERGY RELIEF    16 g    Spray 2 sprays into both nostrils daily    Chronic rhinitis, unspecified type, Nasal turbinate hypertrophy       NYQUIL PO           ranitidine 150 MG tablet    ZANTAC    60 tablet    Take 1 tablet (150 mg) by mouth 2 times daily    Chronic cough       triamcinolone 55 MCG/ACT Inhaler    NASACORT AQ    10.8 mL    Spray 2 sprays into both nostrils daily    Chronic non-seasonal allergic rhinitis, unspecified trigger

## 2018-01-12 NOTE — PATIENT INSTRUCTIONS
The patient presents with a history of nasal obstruction and chronic rhinitis. He will be treated with Astelin Nasal Spray and Flonase Nasal Spray and he will be seen again in four weeks to assess he response to this therapy. He will be referred to Dr. Yusuf Canales for an allergy evaluation.

## 2018-01-12 NOTE — NURSING NOTE
"Chief Complaint   Patient presents with     Consult     Rhinitis    Height 1.71 m (5' 7.32\"), weight 82.1 kg (181 lb).    Lewis Dinh LPN      "

## 2018-06-19 ENCOUNTER — OFFICE VISIT (OUTPATIENT)
Dept: PEDIATRICS | Facility: CLINIC | Age: 28
End: 2018-06-19
Payer: COMMERCIAL

## 2018-06-19 VITALS
SYSTOLIC BLOOD PRESSURE: 102 MMHG | BODY MASS INDEX: 26.99 KG/M2 | HEART RATE: 84 BPM | OXYGEN SATURATION: 98 % | WEIGHT: 174 LBS | TEMPERATURE: 98.8 F | DIASTOLIC BLOOD PRESSURE: 60 MMHG

## 2018-06-19 DIAGNOSIS — R09.82 POST-NASAL DRIP: ICD-10-CM

## 2018-06-19 DIAGNOSIS — R05.3 CHRONIC COUGH: Primary | ICD-10-CM

## 2018-06-19 PROCEDURE — 99203 OFFICE O/P NEW LOW 30 MIN: CPT | Performed by: INTERNAL MEDICINE

## 2018-06-19 RX ORDER — IPRATROPIUM BROMIDE 42 UG/1
2 SPRAY, METERED NASAL
Qty: 1 BOX | Refills: 3 | Status: SHIPPED | OUTPATIENT
Start: 2018-06-19

## 2018-06-19 NOTE — MR AVS SNAPSHOT
"              After Visit Summary   6/19/2018    Miguelangel Umanzor    MRN: [id number]           Patient Information     Date Of Birth          1990        Visit Information        Provider Department      6/19/2018 2:00 PM Chavez Renteria MD [facility]        Today's Diagnoses     Chronic cough    -  1    Post-nasal drip          Care Instructions    INSTRUCTIONS FOR TODAY:     chronic cough--suspect due to post nasal drip   post nasal drip: start atrovent nasal spray and start either Zyrtec or Allegra each evening (over the counter)   continue regimen for 2 weeks and follow-up if no improvement     Dr Renteria            Follow-ups after your visit        Who to contact     If you have questions or need follow up information about today's clinic visit or your schedule please contact [facility] directly at [phone].  Normal or non-critical lab and imaging results will be communicated to you by [employer], letter or phone within 4 business days after the clinic has received the results. If you do not hear from us within 7 days, please contact the clinic through [employer] or phone. If you have a critical or abnormal lab result, we will notify you by phone as soon as possible.  Submit refill requests through [employer] or call your pharmacy and they will forward the refill request to us. Please allow 3 business days for your refill to be completed.          Additional Information About Your Visit        MyChart Information     [employer] lets you send messages to your doctor, view your test results, renew your prescriptions, schedule appointments and more. To sign up, go to [email or web address] . Click on \"Log in\" on the left side of the screen, which will take you to the Welcome page. Then click on \"Sign up Now\" on the right side of the page.     You will be asked to enter the access code listed below, as well as some personal information. Please follow the directions to create your username " and password.     Your access code is: [email or web address]  Expires: 2018  2:03 PM     Your access code will  in 90 days. If you need help or a new code, please call your [facility] or [phone].        Care EveryWhere ID     This is your Care EveryWhere ID. This could be used by other organizations to access your [place] medical records  [id number]        Your Vitals Were     Pulse Temperature Pulse Oximetry BMI (Body Mass Index)          84 98.8  F (37.1  C) (Oral) 98% 26.99 kg/m2         Blood Pressure from Last 3 Encounters:   18 102/60   17 125/65   10/24/17 112/72    Weight from Last 3 Encounters:   18 174 lb (78.9 kg)   18 181 lb (82.1 kg)   17 185 lb 9.6 oz (84.2 kg)              Today, you had the following     No orders found for display         Today's Medication Changes          These changes are accurate as of 18  2:38 PM.  If you have any questions, ask your nurse or doctor.               Start taking these medicines.        Dose/Directions    ipratropium 0.06 % spray   Commonly known as:  ATROVENT   Used for:  Post-nasal drip   Started by:  Chavez Renteria MD        Dose:  2 spray   Spray 2 sprays into both nostrils 2 times daily   Quantity:  1 Box   Refills:  3            Where to get your medicines      These medications were sent to [facility] - [place], [place] - [address]  AT [address] [facility] [address] [place] [address]     Phone:  [phone]     ipratropium 0.06 % spray                Primary Care Provider Office Phone # Fax #    CRISTÓBAL Mullen [facility] [phone]       [address] [address]        Equal Access to Services     RAFAELA ELIAS AH: Vijay Melara, waaxda luqadaha, qaybta kaalwarren manuel, alirio cam. So [facility] [phone].    ATENCIÓN: Si habla español, tiene a rodriguez disposición servicios gratuitos de  asistencia lingüística. Efe al [phone].    We comply with applicable federal civil rights laws and [place] laws. We do not discriminate on the basis of race, color, national origin, age, disability, sex, sexual orientation, or gender identity.            Thank you!     Thank you for choosing [facility] [place]  for your care. Our goal is always to provide you with excellent care. Hearing back from our patients is one way we can continue to improve our services. Please take a few minutes to complete the written survey that you may receive in the mail after your visit with us. Thank you!             Your Updated Medication List - Protect others around you: Learn how to safely use, store and throw away your medicines at www.disposemymeds.org.          This list is accurate as of 6/19/18  2:38 PM.  Always use your most recent med list.                   Brand Name Dispense Instructions for use Diagnosis    albuterol 108 (90 Base) MCG/ACT Inhaler    PROAIR HFA    6.7 g    Inhale 2 puffs into the lungs every 6 hours as needed for shortness of breath / dyspnea or wheezing    Cough, Shortness of breath       cetirizine 10 MG tablet    zyrTEC    30 tablet    Take 1 tablet (10 mg) by mouth daily    Chronic non-seasonal allergic rhinitis, unspecified trigger       ipratropium 0.06 % spray    ATROVENT    1 Box    Spray 2 sprays into both nostrils 2 times daily    Post-nasal drip       NYQUIL PO           ranitidine 150 MG tablet    ZANTAC    60 tablet    Take 1 tablet (150 mg) by mouth 2 times daily    Chronic cough       triamcinolone 55 MCG/ACT Inhaler    NASACORT AQ    10.8 mL    Spray 2 sprays into both nostrils daily    Chronic non-seasonal allergic rhinitis, unspecified trigger

## 2018-06-19 NOTE — PROGRESS NOTES
SUBJECTIVE:   Miguelangel Umanzor is a 27 year old male who presents to clinic today for the following health issues:    Chronic cough    27-year-old male presents today for evaluation of persistent cough for the past year.  Patient complains of intermittent minimally productive cough during that time.  Patient denies fevers chills unintentional weight loss hemoptysis or shortness of breath.  Prior evaluation has included PFT's to evaluate for underlying asthma which were normal.  In addition, patient was started on a trial of ranitidine for possible acid reflux-patient states this offered no improvement.  Patient denies any known trigger, does state that he has almost year-round sputum production and postnasal drainage.  Apparently tried a nasal spray previously which did help somewhat.  Does not take antihistamines.    Duration:       Description  nasal congestion and cough    Severity: moderate    Accompanying signs and symptoms: As above    History (predisposing factors): As above    Precipitating or alleviating factors:     Therapies tried and outcome:  nasal spray      Patient Active Problem List   Diagnosis     Chronic non-seasonal allergic rhinitis, unspecified trigger     Chronic cough     No past surgical history on file.    Social History   Substance Use Topics     Smoking status: Never Smoker     Smokeless tobacco: Never Used     Alcohol use Yes      Comment: rare, about 3-4x per month     Family History   Problem Relation Age of Onset     Allergy (Severe) Father      Allergy (Severe) Paternal Grandfather      Diabetes No family hx of      HEART DISEASE No family hx of          Current Outpatient Prescriptions   Medication Sig Dispense Refill     albuterol (PROAIR HFA) 108 (90 BASE) MCG/ACT Inhaler Inhale 2 puffs into the lungs every 6 hours as needed for shortness of breath / dyspnea or wheezing (Patient not taking: Reported on 1/12/2018) 6.7 g 1     cetirizine (ZYRTEC) 10 MG tablet Take 1 tablet (10 mg)  by mouth daily (Patient not taking: Reported on 6/19/2018) 30 tablet 3     ipratropium (ATROVENT) 0.06 % spray Spray 2 sprays into both nostrils 2 times daily 1 Box 3     Pseudoeph-Doxylamine-DM-APAP (NYQUIL PO)        ranitidine (ZANTAC) 150 MG tablet Take 1 tablet (150 mg) by mouth 2 times daily (Patient not taking: Reported on 6/19/2018) 60 tablet 1     triamcinolone (NASACORT AQ) 55 MCG/ACT Inhaler Spray 2 sprays into both nostrils daily (Patient not taking: Reported on 6/19/2018) 10.8 mL 1       ROS: The following systems have been completely reviewed and are negative except as noted in the HPI: CONSTITUTIONAL, HEAD AND NECK, CARDIOVASCULAR, PULMONARY, GASTROINTESTINAL    OBJECTIVE:                                                    /60 (Cuff Size: Adult Regular)  Pulse 84  Temp 98.8  F (37.1  C) (Oral)  Wt 174 lb (78.9 kg)  SpO2 98%  BMI 26.99 kg/m2 Body mass index is 26.99 kg/(m^2).  GENERAL:  alert,  no distress  HENT: ear canals- normal; TMs- normal; oropharynx-normal  NECK: no tenderness, no adenopathy  RESP: lungs clear to auscultation - no rales, no rhonchi, no wheezes  CV: regular rates and rhythm, normal S1 S2, no S3 or S4 and no murmur, no click or rub   MS: extremities- no edema     ASSESSMENT/PLAN:                                                        ICD-10-CM    1. Chronic cough R05  chronic cough differential reviewed: Postnasal drainage, GERD, asthma, pneumonia, PE, ILD  Benign exam, lack of other systemic symptoms make underlying infectious process/pneumonia less likely.  Without associated shortness of breath, PE seems less likely.  Prior trial treatment for GERD was not beneficial.  Prior PFTs not consistent with asthma.         Suspect chronic cough likely secondary to ongoing postnasal drainage.  Recommended patient start Atrovent nasal spray with cetirizine or allegra each evening.  Return for follow-up 2 weeks if symptoms fail to resolve     2. Post-nasal drip R09.82  ipratropium (ATROVENT) 0.06 % spray   as above        Chavez Renteria MD  [facility]

## 2018-06-19 NOTE — PATIENT INSTRUCTIONS
INSTRUCTIONS FOR TODAY:     chronic cough--suspect due to post nasal drip   post nasal drip: start atrovent nasal spray and start either Zyrtec or Allegra each evening (over the counter)   continue regimen for 2 weeks and follow-up if no improvement     Dr Renteria

## 2018-06-20 ENCOUNTER — TELEPHONE (OUTPATIENT)
Dept: PEDIATRICS | Facility: CLINIC | Age: 28
End: 2018-06-20

## 2018-06-20 DIAGNOSIS — R05.3 CHRONIC COUGH: Primary | ICD-10-CM

## 2018-06-20 RX ORDER — CODEINE PHOSPHATE AND GUAIFENESIN 10; 100 MG/5ML; MG/5ML
1-2 SOLUTION ORAL
Qty: 120 ML | Refills: 0 | Status: SHIPPED | OUTPATIENT
Start: 2018-06-20

## 2018-06-20 NOTE — TELEPHONE ENCOUNTER
Script to station B  Please have pt schedule follow-up visit in next 1-2 weeks if cough fails to improve

## 2018-06-20 NOTE — TELEPHONE ENCOUNTER
Patient calling in follow-up to office appointment yesterday.  Is requesting medication for cough suppression.   Cough keeps him up at night.  Today notes body aches and feels a little feverish.  No shortness of breath or wheezing.  Doesn't feel that post-nasal discharge is the cause for the cough.  Upon awaking this am noted nasal congestion, which improved as patient is awake.  Thinks post-nasal discharge has improved since beginning the medication, but has throat irritation that is causing the cough.  SULMA Ashton RN

## 2018-07-10 ENCOUNTER — OFFICE VISIT (OUTPATIENT)
Dept: PEDIATRICS | Facility: CLINIC | Age: 28
End: 2018-07-10
Payer: COMMERCIAL

## 2018-07-10 VITALS
WEIGHT: 173 LBS | OXYGEN SATURATION: 99 % | HEART RATE: 67 BPM | DIASTOLIC BLOOD PRESSURE: 60 MMHG | BODY MASS INDEX: 26.84 KG/M2 | TEMPERATURE: 98.7 F | SYSTOLIC BLOOD PRESSURE: 100 MMHG

## 2018-07-10 DIAGNOSIS — R09.82 POST-NASAL DRIP: ICD-10-CM

## 2018-07-10 DIAGNOSIS — R05.3 CHRONIC COUGH: Primary | ICD-10-CM

## 2018-07-10 PROCEDURE — 99213 OFFICE O/P EST LOW 20 MIN: CPT | Performed by: INTERNAL MEDICINE

## 2018-07-10 NOTE — MR AVS SNAPSHOT
After Visit Summary   7/10/2018    Miguelangel Umanzor    MRN: [id number]           Patient Information     Date Of Birth          1990        Visit Information        Provider Department      7/10/2018 8:40 AM [facility] MD [facility]        Today's Diagnoses     Chronic cough    -  1    Post-nasal drip          Care Instructions    INSTRUCTIONS FOR TODAY:     schedule visit with ENT   continue Atrovent spray and allegra as needed for cough     Dr Renteria            Follow-ups after your visit        Additional Services     OTOLARYNGOLOGY REFERRAL       Your provider has referred you to: N: [facility] - [place] [phone]   [email or web address]    Please be aware that coverage of these services is subject to the terms and limitations of your health insurance plan.  Call member services at your health plan with any benefit or coverage questions.      Please bring the following with you to your appointment:    (1) Any X-Rays, CTs or MRIs which have been performed.  Contact the facility where they were done to arrange for  prior to your scheduled appointment.   (2) List of current medications  (3) This referral request   (4) Any documents/labs given to you for this referral                  Who to contact     If you have questions or need follow up information about today's clinic visit or your schedule please contact [facility] [place] directly at [phone].  Normal or non-critical lab and imaging results will be communicated to you by MyChart, letter or phone within 4 business days after the clinic has received the results. If you do not hear from us within 7 days, please contact the clinic through MyChart or phone. If you have a critical or abnormal lab result, we will notify you by phone as soon as possible.  Submit refill requests through [employer] or call your pharmacy and they will forward the refill request to us. Please allow 3  business days for your refill to be completed.          Additional Information About Your Visit        MyChart Information     [employer] gives you secure access to your electronic health record. If you see a primary care provider, you can also send messages to your care team and make appointments. If you have questions, please call your primary care clinic.  If you do not have a primary care provider, please call [phone] and they will assist you.        Care EveryWhere ID     This is your Care EveryWhere ID. This could be used by other organizations to access your [place] medical records  [id number]        Your Vitals Were     Pulse Temperature Pulse Oximetry BMI (Body Mass Index)          67 98.7  F (37.1  C) (Oral) 99% 26.84 kg/m2         Blood Pressure from Last 3 Encounters:   07/10/18 100/60   06/19/18 102/60   11/27/17 125/65    Weight from Last 3 Encounters:   07/10/18 173 lb (78.5 kg)   06/19/18 174 lb (78.9 kg)   01/12/18 181 lb (82.1 kg)              We Performed the Following     OTOLARYNGOLOGY REFERRAL          Today's Medication Changes          These changes are accurate as of 7/10/18  9:10 AM.  If you have any questions, ask your nurse or doctor.               Stop taking these medicines if you haven't already. Please contact your care team if you have questions.     albuterol 108 (90 Base) MCG/ACT Inhaler   Commonly known as:  PROAIR HFA   Stopped by:  Chavez Renteria MD           NYQUIL PO   Stopped by:  Chavez Renteria MD           ranitidine 150 MG tablet   Commonly known as:  ZANTAC   Stopped by:  Chavez Renteria MD           triamcinolone 55 MCG/ACT Inhaler   Commonly known as:  NASACORT AQ   Stopped by:  Chavez Renteria MD                    Primary Care Provider Office Phone # Fax #    Becky CRISTÓBAL [facility] [phone]       [address] [address]        Equal Access to Services     RAFAELA ELIAS AH: Vijay Melara  wasunnyda karie, qaybta kacachorro manuel, alirio browneaaevelyn ah. So [facility] [phone].    ATENCIÓN: Si elizabeth gore, tiene a rodriguez disposición servicios gratuitos de asistencia lingüística. Efe al [phone].    We comply with applicable federal civil rights laws and [place] laws. We do not discriminate on the basis of race, color, national origin, age, disability, sex, sexual orientation, or gender identity.            Thank you!     Thank you for choosing [facility] [place]  for your care. Our goal is always to provide you with excellent care. Hearing back from our patients is one way we can continue to improve our services. Please take a few minutes to complete the written survey that you may receive in the mail after your visit with us. Thank you!             Your Updated Medication List - Protect others around you: Learn how to safely use, store and throw away your medicines at www.disposemymeds.org.          This list is accurate as of 7/10/18  9:10 AM.  Always use your most recent med list.                   Brand Name Dispense Instructions for use Diagnosis    cetirizine 10 MG tablet    zyrTEC    30 tablet    Take 1 tablet (10 mg) by mouth daily    Chronic non-seasonal allergic rhinitis, unspecified trigger       guaiFENesin-codeine 100-10 MG/5ML Soln solution    ROBITUSSIN AC    120 mL    Take 5-10 mLs by mouth nightly as needed for cough    Chronic cough       ipratropium 0.06 % spray    ATROVENT    1 Box    Spray 2 sprays into both nostrils 2 times daily    Post-nasal drip

## 2018-07-10 NOTE — PROGRESS NOTES
SUBJECTIVE:                                                    Miguelangel Umanzor is a 27 year old male who presents to clinic today for the following health issues:    27-year-old male returns for follow-up of chronic cough.  At most recent visit cough was attributed to postnasal drip.  Patient was instructed to start combination of Atrovent nasal spray and fexofenadine.  Patient states he used both medications as well as guaifenesin and cough essentially resolved within 1-2 weeks.  He recently stopped the medication and notes that the cough returned.  Today patient has questions about additional medication may which may eliminate the problem.  He denies any known seasonal allergies.  He states the chronic cough is present year round.  Denies sinus pain pressure or purulent discharge.  Denies acid reflux symptoms.    Patient Active Problem List   Diagnosis     Chronic non-seasonal allergic rhinitis, unspecified trigger     Chronic cough     No past surgical history on file.    Social History   Substance Use Topics     Smoking status: Never Smoker     Smokeless tobacco: Never Used     Alcohol use Yes      Comment: rare, about 3-4x per month     Family History   Problem Relation Age of Onset     Allergy (Severe) Father      Allergy (Severe) Paternal Grandfather      Diabetes No family hx of      HEART DISEASE No family hx of          Current Outpatient Prescriptions   Medication Sig Dispense Refill     guaiFENesin-codeine (ROBITUSSIN AC) 100-10 MG/5ML SOLN solution Take 5-10 mLs by mouth nightly as needed for cough 120 mL 0     ipratropium (ATROVENT) 0.06 % spray Spray 2 sprays into both nostrils 2 times daily 1 Box 3       OBJECTIVE:                                                    /60 (Cuff Size: Adult Regular)  Pulse 67  Temp 98.7  F (37.1  C) (Oral)  Wt 173 lb (78.5 kg)  SpO2 99%  BMI 26.84 kg/m2 Body mass index is 26.84 kg/(m^2).  GENERAL:  alert,  no distress     ASSESSMENT/PLAN:                                                         ICD-10-CM    1. Chronic cough R05 OTOLARYNGOLOGY REFERRAL   2. Post-nasal drip R09.82       Chronic postnasal drip with associated chronic cough.  Recommended continuing fexofenadine and Atrovent nasal spray when cough occurs.    Patient would like a second opinion-referred to ENT for rhinoscopy/laryngoscopy to rule out other causes, sinus abnormalities or deviated septum.    Chavez Renteria MD  [facility]

## 2018-07-10 NOTE — PATIENT INSTRUCTIONS
INSTRUCTIONS FOR TODAY:     schedule visit with ENT   continue Atrovent spray and allegra as needed for cough     Dr Renteria

## 2019-06-17 PROBLEM — J30.89 CHRONIC NON-SEASONAL ALLERGIC RHINITIS: Status: ACTIVE | Noted: 2017-10-24

## 2020-03-11 ENCOUNTER — HEALTH MAINTENANCE LETTER (OUTPATIENT)
Age: 30
End: 2020-03-11

## 2020-12-27 ENCOUNTER — HEALTH MAINTENANCE LETTER (OUTPATIENT)
Age: 30
End: 2020-12-27

## 2021-04-25 ENCOUNTER — HEALTH MAINTENANCE LETTER (OUTPATIENT)
Age: 31
End: 2021-04-25

## 2021-10-09 ENCOUNTER — HEALTH MAINTENANCE LETTER (OUTPATIENT)
Age: 31
End: 2021-10-09

## 2022-05-21 ENCOUNTER — HEALTH MAINTENANCE LETTER (OUTPATIENT)
Age: 32
End: 2022-05-21

## 2022-09-17 ENCOUNTER — HEALTH MAINTENANCE LETTER (OUTPATIENT)
Age: 32
End: 2022-09-17

## 2023-06-04 ENCOUNTER — HEALTH MAINTENANCE LETTER (OUTPATIENT)
Age: 33
End: 2023-06-04

## (undated) RX ORDER — ALBUTEROL SULFATE 0.83 MG/ML
SOLUTION RESPIRATORY (INHALATION)
Status: DISPENSED
Start: 2017-12-01